# Patient Record
Sex: FEMALE | Race: WHITE
[De-identification: names, ages, dates, MRNs, and addresses within clinical notes are randomized per-mention and may not be internally consistent; named-entity substitution may affect disease eponyms.]

---

## 2021-01-22 ENCOUNTER — HOSPITAL ENCOUNTER (EMERGENCY)
Dept: HOSPITAL 41 - JD.ED | Age: 46
Discharge: HOME | End: 2021-01-22
Payer: COMMERCIAL

## 2021-01-22 VITALS — HEART RATE: 100 BPM | DIASTOLIC BLOOD PRESSURE: 95 MMHG | SYSTOLIC BLOOD PRESSURE: 139 MMHG

## 2021-01-22 DIAGNOSIS — E66.9: ICD-10-CM

## 2021-01-22 DIAGNOSIS — Z79.899: ICD-10-CM

## 2021-01-22 DIAGNOSIS — K57.32: Primary | ICD-10-CM

## 2021-01-22 DIAGNOSIS — Z88.2: ICD-10-CM

## 2021-01-22 DIAGNOSIS — K21.9: ICD-10-CM

## 2021-01-22 DIAGNOSIS — Z88.0: ICD-10-CM

## 2021-01-22 PROCEDURE — 96375 TX/PRO/DX INJ NEW DRUG ADDON: CPT

## 2021-01-22 PROCEDURE — 86140 C-REACTIVE PROTEIN: CPT

## 2021-01-22 PROCEDURE — 85025 COMPLETE CBC W/AUTO DIFF WBC: CPT

## 2021-01-22 PROCEDURE — 96376 TX/PRO/DX INJ SAME DRUG ADON: CPT

## 2021-01-22 PROCEDURE — 96365 THER/PROPH/DIAG IV INF INIT: CPT

## 2021-01-22 PROCEDURE — 36415 COLL VENOUS BLD VENIPUNCTURE: CPT

## 2021-01-22 PROCEDURE — 81025 URINE PREGNANCY TEST: CPT

## 2021-01-22 PROCEDURE — 74176 CT ABD & PELVIS W/O CONTRAST: CPT

## 2021-01-22 PROCEDURE — 81001 URINALYSIS AUTO W/SCOPE: CPT

## 2021-01-22 PROCEDURE — 96368 THER/DIAG CONCURRENT INF: CPT

## 2021-01-22 PROCEDURE — 99284 EMERGENCY DEPT VISIT MOD MDM: CPT

## 2021-01-22 PROCEDURE — 76830 TRANSVAGINAL US NON-OB: CPT

## 2021-01-22 NOTE — EDM.PDOC
<Ankit De Jesus - Last Filed: 01/22/21 07:22>





ED HPI GENERAL MEDICAL PROBLEM





- General


Chief Complaint: Genitourinary Problem


Stated Complaint: FEVER POSS UTI BACK PAIN


Time Seen by Provider: 01/22/21 04:46


Source of Information: Reports: Patient


History Limitations: Reports: No Limitations





- History of Present Illness


INITIAL COMMENTS - FREE TEXT/NARRATIVE: 





Mrs. Meyer is a very pleasant 45-year-old woman who now presents the ED with 

progressively worsening suprapubic abdominal pain radiating to her left flank 

that developed around 16:00 yesterday, Thursday, 1/21/2021.  She describes the 

pain as "seizing".  It is constant.  Urination causes the pain to worsen, 

otherwise, she has not identified any modifiers, such as with position or 

breathing.  She has had some nausea and vomiting, and states that she had some 

watery diarrhea yesterday.  No gross hematuria.  No recent constipation.  No 

recent fever, although she states that she did have a temperature of 100.1 

degrees, for which she took Tylenol around 02:00 this morning.  She states that 

she developed dysuria and urinary incontinence about the time she arrived to the

ED.  Her last urination was just prior to coming to the ED.





The patient states that she has had similar symptoms in the past, due to a UTI, 

however, prior symptoms have not come on as quickly as her current symptoms 

have.





Here in the ED, the patient's initial BP is found to be slightly elevated at 

139/95, otherwise, she is hemodynamically stable, afebrile, saturating 100% on 

room air.





Prior to yesterday afternoon, the patient denies having a recent fever, chills, 

sore throat, ear pain, nasal or sinus congestion, cough, dyspnea, chest pain, 

palpitations, nausea, vomiting, constipation, diarrhea, abdominal pain, urinary 

symptoms, recent weight gain or weight loss, recent bloody bowel movements or 

black bowel movements, recent joint aches, headaches, or rashes.








The patient's PCP is Olga Murcia NP.


Her Orthopedic Surgeon is Dr. Bar Webb.


She states that she has already received an influenza vaccine this season.








  ** Left Flank


Pain Score (Numeric/FACES): 9





- Related Data


                                    Allergies











Allergy/AdvReac Type Severity Reaction Status Date / Time


 


Penicillins Allergy  Rash Verified 01/22/21 04:51


 


Sulfa (Sulfonamide Allergy  Rash Verified 01/22/21 04:51





Antibiotics)     











Home Meds: 


                                    Home Meds





Loratadine [Claritin] 10 mg PO BEDTIME 05/15/14 [History]


Famotidine [Pepcid] 20 mg PO DAILY #30 tablet 11/04/15 [Rx]


Acetaminophen/HYDROcodone [Norco 325-5 MG] 1 tab PO Q6H PRN #10 tablet 01/22/21 

[Rx]


Ondansetron [Zofran ODT] 4 mg PO Q8HR PRN #7 tab.dis 01/22/21 [Rx]


levoFLOXacin [Levaquin] 500 mg PO DAILY #10 tab 01/22/21 [Rx]


metroNIDAZOLE [Flagyl] 500 mg PO Q8H #20 tab 01/22/21 [Rx]











Past Medical History


HEENT History: Reports: Allergic Rhinitis


Cardiovascular History: Reports: High Cholesterol (untreated)


Gastrointestinal History: Reports: GERD


Musculoskeletal History: Reports: Other (See Below) (Complex regional pain 

syndrome right foot)


Endocrine/Metabolic History: Reports: Obesity/BMI 30+





- Past Surgical History


HEENT Surgical History: Reports: Tonsillectomy


GI Surgical History: Reports: Appendectomy


Female  Surgical History: Reports: Endometrial Ablation, Tubal Ligation


Dermatological Surgical History: Reports: Other (See Below) (Right thigh benign 

tumors excised)





Social & Family History





- Tobacco Use


Tobacco Use Status *Q: Never Tobacco User





- Caffeine Use


Caffeine Use: Reports: None





- Alcohol Use


Alcohol Use History: Yes


Alcohol Use Frequency: Socially





- Recreational Drug Use


Recreational Drug Use: No





- Living Situation & Occupation


Living situation: Reports: , with Spouse


Occupation: Employed ([employer])





ED ROS GENERAL





- Review of Systems


Review Of Systems: Comprehensive ROS is negative, except as noted in HPI.





ED EXAM, RENAL/





- Physical Exam


Exam: See Below


Exam Limited By: No Limitations


General Appearance: Alert, WD/WN, Mild Distress (appears uncomfortable)


Eye Exam: Bilateral Eye: EOMI, Normal Inspection


Ears: Normal External Exam, Hearing Grossly Normal


Nose: Normal Inspection


Throat/Mouth: Normal Inspection, Normal Lips, Normal Voice, No Airway Compromise


Head: Atraumatic, Normocephalic


Neck: Normal Inspection, Full Range of Motion


Respiratory/Chest: No Respiratory Distress, Lungs Clear, Normal Breath Sounds, 

No Accessory Muscle Use


Cardiovascular: Normal Peripheral Pulses, No Gallop, No JVD, No Murmur, No Rub, 

Tachycardia (regular)


GI/Abdominal: Normal Bowel Sounds, Soft, No Organomegaly, No Distention, No 

Abnormal Bruit, No Mass, Tender (Suprapubic only.  Nontender elsewhere.)


Back Exam: Normal Inspection, Full Range of Motion, CVA Tenderness (L).  No: CVA

 Tenderness (R)


Extremities: Normal Inspection, Normal Range of Motion, Normal Capillary Refill


Neurological: Alert, Oriented, Normal Cognition, No Motor/Sensory Deficits


Psychiatric: Anxious


Skin Exam: Warm, Dry, Intact, Normal Color, No Rash





Course





- Re-Assessments/Exams


Free Text/Narrative Re-Assessment/Exam: 





01/22/21 05:01


As above, the patient developed crampy lower abdominal pain that radiates to her

 left flank yesterday afternoon, that has progressively gotten worse.  She has 

had nausea and vomiting, and, just prior to coming to the ED, developed dysuria.

  On examination, she has considerable suprapubic tenderness and left flank 

tenderness.  Her symptoms are most consistent with a UTI, although a left 

ureterolith is also possible, albeit unlikely.  I have ordered a urinalysis and 

urine pregnancy test.








01/22/21 07:16


The patient's urinalysis is completely unremarkable.


Her urine pregnancy test is negative.





Based on the above, I have ordered a CT of the abdomen and pelvis without 

contrast, along with some IV Dilaudid, IV Zofran, oral tamsulosin, and IV fluid.





Case discussed with Dr. Chester, and care of the patient turned over to him at 

this time, for change of shift.











Departure





- Departure


Disposition: Home, Self-Care 01


Clinical Impression: 


 Diverticulitis





Abdominal pain


Qualifiers:


 Abdominal location: lower abdomen, unspecified Qualified Code(s): R10.30 - 

Lower abdominal pain, unspecified








- Discharge Information


Prescriptions: 


metroNIDAZOLE [Flagyl] 500 mg PO Q8H #20 tab


levoFLOXacin [Levaquin] 500 mg PO DAILY #10 tab


Acetaminophen/HYDROcodone [Norco 325-5 MG] 1 tab PO Q6H PRN #10 tablet


 PRN Reason: Pain


Ondansetron [Zofran ODT] 4 mg PO Q8HR PRN #7 tab.dis


 PRN Reason: Nausea/Vomiting


Instructions:  Diverticulitis, Easy-to-Read, Abdominal Pain, Adult, Easy-to-Read


Referrals: 


Olga Murcia, NP [Primary Care Provider] - 


Forms:  ED Department Discharge


Additional Instructions: 


Clear liquids until this evening or even until tomorrow, than very careful bland

diet as tolerated.  Levaquin 500 mg daily for 10 days, next dose tomorrow.  

Flagyl 500 mg 3 times daily, next dose this evening.  Zofran 4 mg ODT if needed 

for further nausea or vomiting.  Tylneol q 6 to 8 hr for mild to moderate pain 

or hydrocodone if needed for severe pain.  Pres[facility] to 

[facility].  Follow up clinic if not well within 2 to 3 days as expected.  

Return to ED as needed if symptoms worsening in any way.  





Sepsis Event Note (ED)





- Evaluation


Sepsis Screening Result: No Definite Risk





<Williams Chester - Last Filed: 01/26/21 19:14>





Course





- Vital Signs


Last Recorded V/S: 


                                Last Vital Signs











Temp  97.6 F   01/22/21 04:40


 


Pulse  100   01/22/21 04:40


 


Resp  18   01/22/21 04:40


 


BP  139/95 H  01/22/21 04:40


 


Pulse Ox  100   01/22/21 04:40














- Orders/Labs/Meds


Labs: 


                                Laboratory Tests











  01/22/21 01/22/21 01/22/21 Range/Units





  07:00 07:00 07:06 


 


WBC     (3.98-10.04)  K/mm3


 


RBC     (3.98-5.22)  M/mm3


 


Hgb     (11.2-15.7)  gm/dl


 


Hct     (34.1-44.9)  %


 


MCV     (79.4-94.8)  fl


 


MCH     (25.6-32.2)  pg


 


MCHC     (32.2-35.5)  g/dl


 


RDW Std Deviation     (36.4-46.3)  fL


 


Plt Count     (182-369)  K/mm3


 


MPV     (9.4-12.3)  fl


 


Neut % (Auto)     (34.0-71.1)  %


 


Lymph % (Auto)     (19.3-51.7)  %


 


Mono % (Auto)     (4.7-12.5)  %


 


Eos % (Auto)     (0.7-5.8)  


 


Baso % (Auto)     (0.1-1.2)  %


 


Neut # (Auto)     (1.56-6.13)  K/mm3


 


Lymph # (Auto)     (1.18-3.74)  K/mm3


 


Mono # (Auto)     (0.24-0.36)  K/mm3


 


Eos # (Auto)     (0.04-0.36)  K/mm3


 


Baso # (Auto)     (0.01-0.08)  K/mm3


 


C-Reactive Protein    4.0 H*  (<1.0)  mg/dL


 


Urine Color  Yellow    (Yellow)  


 


Urine Appearance  Clear    (Clear)  


 


Urine pH  5.5    (5.0-8.0)  


 


Ur Specific Gravity  1.025    (1.005-1.030)  


 


Urine Protein  Negative    (Negative)  


 


Urine Glucose (UA)  Negative    (Negative)  


 


Urine Ketones  Negative    (Negative)  


 


Urine Occult Blood  Negative    (Negative)  


 


Urine Nitrite  Negative    (Negative)  


 


Urine Bilirubin  Negative    (Negative)  


 


Urine Urobilinogen  0.2    (0.2-1.0)  


 


Ur Leukocyte Esterase  Negative    (Negative)  


 


Urine RBC  0-5    (0-5)  /hpf


 


Urine WBC  0-5    (0-5)  /hpf


 


Ur Squamous Epith Cells  0-5    (0-5)  /hpf


 


Urine Bacteria  Few    (FEW)  /hpf


 


Urine Mucus  Few    (FEW)  /hpf


 


Urine HCG, Qual   Negative   (NEGATIVE)  














  01/22/21 Range/Units





  07:06 


 


WBC  13.95 H  (3.98-10.04)  K/mm3


 


RBC  5.03  (3.98-5.22)  M/mm3


 


Hgb  14.9  (11.2-15.7)  gm/dl


 


Hct  44.3  (34.1-44.9)  %


 


MCV  88.1  (79.4-94.8)  fl


 


MCH  29.6  (25.6-32.2)  pg


 


MCHC  33.6  (32.2-35.5)  g/dl


 


RDW Std Deviation  41.8  (36.4-46.3)  fL


 


Plt Count  310  (182-369)  K/mm3


 


MPV  10.3  (9.4-12.3)  fl


 


Neut % (Auto)  79.9 H  (34.0-71.1)  %


 


Lymph % (Auto)  10.2 L  (19.3-51.7)  %


 


Mono % (Auto)  9.5  (4.7-12.5)  %


 


Eos % (Auto)  0.1 L  (0.7-5.8)  


 


Baso % (Auto)  0.1  (0.1-1.2)  %


 


Neut # (Auto)  11.15 H  (1.56-6.13)  K/mm3


 


Lymph # (Auto)  1.42  (1.18-3.74)  K/mm3


 


Mono # (Auto)  1.32 H  (0.24-0.36)  K/mm3


 


Eos # (Auto)  0.01 L  (0.04-0.36)  K/mm3


 


Baso # (Auto)  0.02  (0.01-0.08)  K/mm3


 


C-Reactive Protein   (<1.0)  mg/dL


 


Urine Color   (Yellow)  


 


Urine Appearance   (Clear)  


 


Urine pH   (5.0-8.0)  


 


Ur Specific Gravity   (1.005-1.030)  


 


Urine Protein   (Negative)  


 


Urine Glucose (UA)   (Negative)  


 


Urine Ketones   (Negative)  


 


Urine Occult Blood   (Negative)  


 


Urine Nitrite   (Negative)  


 


Urine Bilirubin   (Negative)  


 


Urine Urobilinogen   (0.2-1.0)  


 


Ur Leukocyte Esterase   (Negative)  


 


Urine RBC   (0-5)  /hpf


 


Urine WBC   (0-5)  /hpf


 


Ur Squamous Epith Cells   (0-5)  /hpf


 


Urine Bacteria   (FEW)  /hpf


 


Urine Mucus   (FEW)  /hpf


 


Urine HCG, Qual   (NEGATIVE)  











Meds: 


Medications














Discontinued Medications














Generic Name Dose Route Start Last Admin





  Trade Name Freq  PRN Reason Stop Dose Admin


 


Hydromorphone HCl  1 mg  01/22/21 07:13  01/22/21 07:18





  Dilaudid  IVPUSH  01/22/21 07:14  1 mg





  ONETIME ONE   Administration


 


Hydromorphone HCl  0.5 mg  01/22/21 08:47  01/22/21 08:58





  Dilaudid  IVPUSH  01/22/21 08:48  0.5 mg





  ONETIME ONE   Administration


 


Sodium Chloride  1,000 mls @ 150 mls/hr  01/22/21 07:15  01/22/21 07:18





  Normal Saline  IV   150 mls/hr





  ASDIRECTED NELDA   Administration


 


Levofloxacin/Dextrose 500 mg/  100 mls @ 100 mls/hr  01/22/21 10:12  01/22/21 

10:23





  Premix  IV  01/22/21 11:11  100 mls/hr





  ONETIME ONE   Administration


 


Metronidazole 500 mg/ Premix  100 mls @ 100 mls/hr  01/22/21 10:12  01/22/21 

10:23





  IV  01/22/21 11:11  100 mls/hr





  ONETIME ONE   Administration


 


Metoclopramide HCl  5 mg  01/22/21 08:47  01/22/21 08:58





  Reglan  IVPUSH  01/22/21 08:48  5 mg





  ONETIME ONE   Administration


 


Ondansetron HCl  4 mg  01/22/21 07:13  01/22/21 07:18





  Zofran  IVPUSH  01/22/21 07:14  4 mg





  ONETIME ONE   Administration


 


Tamsulosin HCl  0.4 mg  01/22/21 07:17  01/22/21 07:23





  Flomax  PO  01/22/21 07:18  0.4 mg





  ONETIME ONE   Administration














- Re-Assessments/Exams


Free Text/Narrative Re-Assessment/Exam: 





01/22/21 08:30.  Have assumed care from Dr De Jesus after change of shift.  I 

agree with his hx and exam as documented. CT shows mild inflammatory change 

around L sigmoid diverticuli.  Her pain is getting worse but once again more R 

sided.  She has had nausea, vomiting since last evening and mild diarrhea.  On 

exam she continues to pelvic and R lower abd and pelvic tenderness.  Will check 

pelvic US to R/O ruptured ovarian cyst.  Will also check a CBC, CRP.  














Departure





- Departure


Time of Disposition: 10:24


Condition: Fair

## 2021-01-22 NOTE — US
Pelvic ultrasound: Multiple real-time images of the pelvis were 

obtained transvaginally.

 

Comparison: Prior CT abdomen and pelvis study of 01/22/21.

 

Uterus is anteverted.  Minimal amount of fluid is seen within the 

endometrial cavity.  No myometrial abnormality is appreciated.  

Endometrial thickness measures 8 mm.

 

Small hemorrhagic cyst is noted within the right ovary measuring 1.6 

cm.  Left ovary shows a small cyst measuring 2.0 cm.  Both of these 

findings are most likely incidental.

 

Minimal free fluid is seen within the cul-de-sac most likely 

physiologic.

 

Measurements:

Uterus: Length 8.1 cm, AP height 4.6 cm,transverse width 5.8 cm

Right ovary: 3.0 x 1.8 x 2.2 cm

Left ovary: 4.1 x 2.6 x 2.8 cm

 

Impression:

1.  Small amount of fluid within the endometrial cavity.

2.  Other findings as noted above which are most likely incidental.

 

Diagnostic code #2

## 2021-01-22 NOTE — CT
CT abdomen and pelvis

 

Technique: Multiple axial sections were obtained from below the dome 

of the diaphragm inferiorly through the pubic symphysis.  Intravenous 

and oral contrast not utilized.  Reconstructed coronal and sagittal 

images were obtained.

 

Comparison: No prior CT abdomen or pelvis exam is available.

 

Findings: Kidneys show no abnormal calcifications.  No ureteral 

dilatation or ureteral stone is appreciated.

 

Inflammatory change is noted around the sigmoid colon in an area of 

diverticulosis which is felt compatible with mild diverticulitis.  No 

fluid collections are seen to indicate a diverticular abscess at this 

time.

 

Visualized lung bases show nothing acute.  Liver shows a left-sided 

cyst measuring 1.7 cm.  No additional abnormality is appreciated 

within the visualized liver.  

 

Spleen appears normal.  Adrenal glands show no nodule.  Pancreas 

appears within normal limits.  Gallbladder contains no calcified 

gallstones.

 

Aorta shows no aneurysm.  No retroperitoneal adenopathy or mesenteric 

abnormalities are appreciated.  No pelvic mass or adenopathy is seen. 

 No free fluid is appreciated.  No other inflammatory change is 

appreciated.

 

Appendix is not definitely visualized.

 

Bone window settings were reviewed which appear within normal limits 

for the patient's age.  Very minimal fat-containing umbilical hernia 

is noted.

 

Impression:

1.  Findings are compatible with mild diverticulitis.  No diverticular

 abscess is seen.  This finding occurs within the sigmoid colon.

2.  No renal calculi, ureteral dilatation or ureteral stone is seen.

3.  Other findings which I believe are incidental as described above.

 

Diagnostic code #3